# Patient Record
Sex: MALE | Race: WHITE | NOT HISPANIC OR LATINO | URBAN - METROPOLITAN AREA
[De-identification: names, ages, dates, MRNs, and addresses within clinical notes are randomized per-mention and may not be internally consistent; named-entity substitution may affect disease eponyms.]

---

## 2022-12-21 ENCOUNTER — LAB OUTSIDE AN ENCOUNTER (OUTPATIENT)
Dept: URBAN - METROPOLITAN AREA CLINIC 60 | Facility: CLINIC | Age: 22
End: 2022-12-21

## 2022-12-21 ENCOUNTER — OFFICE VISIT (OUTPATIENT)
Dept: URBAN - METROPOLITAN AREA CLINIC 60 | Facility: CLINIC | Age: 22
End: 2022-12-21
Payer: OTHER GOVERNMENT

## 2022-12-21 ENCOUNTER — WEB ENCOUNTER (OUTPATIENT)
Dept: URBAN - METROPOLITAN AREA CLINIC 60 | Facility: CLINIC | Age: 22
End: 2022-12-21

## 2022-12-21 VITALS
WEIGHT: 199.8 LBS | HEART RATE: 90 BPM | HEIGHT: 74 IN | TEMPERATURE: 98.7 F | DIASTOLIC BLOOD PRESSURE: 72 MMHG | OXYGEN SATURATION: 99 % | RESPIRATION RATE: 12 BRPM | SYSTOLIC BLOOD PRESSURE: 124 MMHG | BODY MASS INDEX: 25.64 KG/M2

## 2022-12-21 DIAGNOSIS — K62.5 RECTAL BLEEDING: ICD-10-CM

## 2022-12-21 PROCEDURE — 99204 OFFICE O/P NEW MOD 45 MIN: CPT | Performed by: PHYSICIAN ASSISTANT

## 2022-12-21 NOTE — HPI-TODAY'S VISIT:
22-year-old male with anxiety and depression is referred to the office for evaluation of intermittent hematochezia over 1 year.  He states he has recal bleeding with bowel movements anywhere from 1 to 4 times per week. He states bowel habits are usually regular. He had some diarrhea last week but usually he does not have diarrhea or constipation. He has perianal pain off an on associated with the bleeding which resolves after he has a bowel movement.   He has mid low abdominal discomfort once or twice per week. Pain will last for 3-4 days before resolving. He denies any triggers. States the pain feels like gas. He also states the pain feels sharp. His pain improves with bowel movements. His weight will fluctuate up and down but no weight loss overall. He voices no upper GI complaints.   He has never had a colonoscopy.    He has a family history of colon cancer in his father diagnosed at age 55.

## 2023-01-10 ENCOUNTER — WEB ENCOUNTER (OUTPATIENT)
Dept: URBAN - METROPOLITAN AREA SURGERY CENTER 4 | Facility: SURGERY CENTER | Age: 23
End: 2023-01-10

## 2023-01-13 ENCOUNTER — CLAIMS CREATED FROM THE CLAIM WINDOW (OUTPATIENT)
Dept: URBAN - METROPOLITAN AREA SURGERY CENTER 4 | Facility: SURGERY CENTER | Age: 23
End: 2023-01-13
Payer: OTHER GOVERNMENT

## 2023-01-13 DIAGNOSIS — K64.0 GRADE I INTERNAL HEMORRHOIDS: ICD-10-CM

## 2023-01-13 DIAGNOSIS — K62.5 RECTAL BLEEDING: ICD-10-CM

## 2023-01-13 PROCEDURE — 45378 DIAGNOSTIC COLONOSCOPY: CPT | Performed by: INTERNAL MEDICINE

## 2023-02-02 ENCOUNTER — WEB ENCOUNTER (OUTPATIENT)
Dept: URBAN - METROPOLITAN AREA CLINIC 63 | Facility: CLINIC | Age: 23
End: 2023-02-02

## 2023-02-02 ENCOUNTER — DASHBOARD ENCOUNTERS (OUTPATIENT)
Age: 23
End: 2023-02-02

## 2023-02-02 ENCOUNTER — OFFICE VISIT (OUTPATIENT)
Dept: URBAN - METROPOLITAN AREA CLINIC 63 | Facility: CLINIC | Age: 23
End: 2023-02-02
Payer: OTHER GOVERNMENT

## 2023-02-02 VITALS
BODY MASS INDEX: 24.92 KG/M2 | TEMPERATURE: 98.2 F | WEIGHT: 194.2 LBS | HEIGHT: 74 IN | DIASTOLIC BLOOD PRESSURE: 70 MMHG | SYSTOLIC BLOOD PRESSURE: 115 MMHG

## 2023-02-02 DIAGNOSIS — K62.5 RECTAL BLEEDING: ICD-10-CM

## 2023-02-02 DIAGNOSIS — Z80.0 FAMILY HISTORY OF COLON CANCER IN FATHER: ICD-10-CM

## 2023-02-02 PROCEDURE — 99212 OFFICE O/P EST SF 10 MIN: CPT | Performed by: PHYSICIAN ASSISTANT

## 2023-02-02 NOTE — HPI-TODAY'S VISIT:
22-year-old male with anxiety and depression presented to the office in December 2021 for evaluation of intermittent hematochezia over 1 year.  He reported having rectal bleeding with bowel movements anywhere from 1-4 times a week.  Bowel habits were usually regular though he did have some diarrhea 1 week prior to his initial office visit.  He reported intermittent perianal pain associated with bleeding which would resolve after having a bowel movement.  He reported mid lower abdominal discomfort which would occur once or twice a week.  The pains would sometimes last for up to 3 to 4 days before resolving.  He denied any triggers.  Stated the pain felt like gas pressure.  He also stated the pain would feel sharp at times and the pain would improve with bowel movements. He follows up today after colonoscopy which was done on January 13, 2023.  His colonoscopy to the terminal ileum was normal with the exception of small internal hemorrhoids.  Repeat colonoscopy was recommended at age 43. He is doing well. He has not had any further rectal bleeding since his last office visit. He has no GI complaints today.  He has a family history of colon cancer in his father who was diagnosed at age 55.